# Patient Record
Sex: MALE | Race: WHITE | NOT HISPANIC OR LATINO | URBAN - NONMETROPOLITAN AREA
[De-identification: names, ages, dates, MRNs, and addresses within clinical notes are randomized per-mention and may not be internally consistent; named-entity substitution may affect disease eponyms.]

---

## 2017-08-03 PROBLEM — H40.013: Noted: 2021-12-07

## 2017-08-03 PROBLEM — H52.13: Noted: 2017-08-03

## 2017-08-03 PROBLEM — H52.223: Noted: 2017-08-03

## 2019-09-11 ENCOUNTER — IMPORTED ENCOUNTER (OUTPATIENT)
Dept: URBAN - NONMETROPOLITAN AREA CLINIC 1 | Facility: CLINIC | Age: 44
End: 2019-09-11

## 2019-09-11 PROCEDURE — S0621 ROUTINE OPHTHALMOLOGICAL EXA: HCPCS

## 2021-12-07 ENCOUNTER — IMPORTED ENCOUNTER (OUTPATIENT)
Dept: URBAN - NONMETROPOLITAN AREA CLINIC 1 | Facility: CLINIC | Age: 46
End: 2021-12-07

## 2021-12-07 PROBLEM — H40.013: Noted: 2021-12-07

## 2021-12-07 PROBLEM — H52.223: Noted: 2017-08-03

## 2021-12-07 PROBLEM — H52.13: Noted: 2017-08-03

## 2021-12-07 PROCEDURE — S0621 ROUTINE OPHTHALMOLOGICAL EXA: HCPCS

## 2021-12-07 NOTE — PATIENT DISCUSSION
Myopia/Astigmatism OUDiscussed refractive status in detail with patient. New glasses Rx given today. Continue to monitor. Glaucoma Suspect OUDiscussed diagnosis in detail with patient. Positive family history (grandparents)IOP at 12 OU. Cup to disc noted at 0.7 OD and 0.75 OS. Continue to monitor. RTC in 6 months with VF 24-2 and OCT

## 2022-04-09 ASSESSMENT — VISUAL ACUITY
OD_SC: 20/20
OS_SC: 20/20
OS_SC: 20/20-1
OD_SC: 20/20-2

## 2022-04-09 ASSESSMENT — TONOMETRY
OD_IOP_MMHG: 16
OS_IOP_MMHG: 20
OD_IOP_MMHG: 20
OS_IOP_MMHG: 16

## 2023-10-03 ENCOUNTER — ESTABLISHED PATIENT (OUTPATIENT)
Dept: RURAL CLINIC 3 | Facility: CLINIC | Age: 48
End: 2023-10-03

## 2023-10-03 DIAGNOSIS — H52.223: ICD-10-CM

## 2023-10-03 DIAGNOSIS — H52.13: ICD-10-CM

## 2023-10-03 PROCEDURE — S0621 ROUTINE OPHTHALMOLOGICAL EXA: HCPCS

## 2023-10-03 ASSESSMENT — VISUAL ACUITY
OS_CC: 20/20
OD_CC: 20/20

## 2023-10-03 ASSESSMENT — TONOMETRY
OS_IOP_MMHG: 16
OD_IOP_MMHG: 16

## 2024-04-24 ENCOUNTER — FOLLOW UP (OUTPATIENT)
Dept: RURAL CLINIC 3 | Facility: CLINIC | Age: 49
End: 2024-04-24

## 2024-04-24 DIAGNOSIS — H43.813: ICD-10-CM

## 2024-04-24 DIAGNOSIS — H40.013: ICD-10-CM

## 2024-04-24 PROCEDURE — 92133 CPTRZD OPH DX IMG PST SGM ON: CPT

## 2024-04-24 PROCEDURE — 92014 COMPRE OPH EXAM EST PT 1/>: CPT

## 2024-04-24 PROCEDURE — 92083 EXTENDED VISUAL FIELD XM: CPT

## 2024-04-24 ASSESSMENT — TONOMETRY
OD_IOP_MMHG: 18
OS_IOP_MMHG: 17

## 2024-04-24 ASSESSMENT — VISUAL ACUITY
OS_CC: 20/20
OU_CC: 20/20
OD_CC: 20/20

## 2025-04-03 ENCOUNTER — COMPREHENSIVE EXAM (OUTPATIENT)
Age: 50
End: 2025-04-03

## 2025-04-03 DIAGNOSIS — H52.4: ICD-10-CM

## 2025-04-03 DIAGNOSIS — H52.13: ICD-10-CM

## 2025-04-03 DIAGNOSIS — H52.223: ICD-10-CM

## 2025-04-03 PROCEDURE — S0621 ROUTINE OPHTHALMOLOGICAL EXA: HCPCS
